# Patient Record
Sex: FEMALE | Race: WHITE | Employment: FULL TIME | URBAN - NONMETROPOLITAN AREA
[De-identification: names, ages, dates, MRNs, and addresses within clinical notes are randomized per-mention and may not be internally consistent; named-entity substitution may affect disease eponyms.]

---

## 2017-07-17 ENCOUNTER — HOSPITAL ENCOUNTER (OUTPATIENT)
Dept: NON INVASIVE DIAGNOSTICS | Age: 37
Discharge: HOME | End: 2017-07-17
Payer: COMMERCIAL

## 2017-07-17 VITALS — BODY MASS INDEX: 24.44 KG/M2 | HEIGHT: 69 IN | WEIGHT: 165 LBS

## 2017-07-17 LAB
LV EF: 55 %
LVEF MODALITY: NORMAL

## 2017-07-17 PROCEDURE — 93660 TILT TABLE EVALUATION: CPT | Performed by: INTERNAL MEDICINE

## 2017-07-17 PROCEDURE — 93306 TTE W/DOPPLER COMPLETE: CPT

## 2017-07-17 PROCEDURE — 93005 ELECTROCARDIOGRAM TRACING: CPT

## 2017-07-17 RX ORDER — ALPRAZOLAM 1 MG/1
1 TABLET ORAL DAILY PRN
COMMUNITY
End: 2019-02-07 | Stop reason: ALTCHOICE

## 2017-07-17 RX ORDER — GABAPENTIN 300 MG/1
300 CAPSULE ORAL DAILY
COMMUNITY
End: 2019-03-25

## 2017-07-18 LAB
EKG ATRIAL RATE: 64 BPM
EKG P AXIS: 14 DEGREES
EKG P-R INTERVAL: 134 MS
EKG Q-T INTERVAL: 420 MS
EKG QRS DURATION: 84 MS
EKG QTC CALCULATION (BAZETT): 433 MS
EKG R AXIS: 62 DEGREES
EKG T AXIS: 45 DEGREES
EKG VENTRICULAR RATE: 64 BPM

## 2018-02-20 ENCOUNTER — OFFICE VISIT (OUTPATIENT)
Dept: PSYCHIATRY | Age: 38
End: 2018-02-20
Payer: COMMERCIAL

## 2018-02-20 DIAGNOSIS — F41.1 GENERALIZED ANXIETY DISORDER: ICD-10-CM

## 2018-02-20 DIAGNOSIS — F41.8 OTHER SPECIFIED ANXIETY DISORDERS: ICD-10-CM

## 2018-02-20 DIAGNOSIS — F33.1 MODERATE EPISODE OF RECURRENT MAJOR DEPRESSIVE DISORDER (HCC): Primary | ICD-10-CM

## 2018-02-20 PROCEDURE — 99204 OFFICE O/P NEW MOD 45 MIN: CPT | Performed by: NURSE PRACTITIONER

## 2018-02-20 RX ORDER — ARIPIPRAZOLE 2 MG/1
2 TABLET ORAL DAILY
Qty: 30 TABLET | Refills: 0 | Status: SHIPPED | OUTPATIENT
Start: 2018-02-20 | End: 2018-03-26 | Stop reason: SDUPTHER

## 2018-02-20 RX ORDER — CITALOPRAM 20 MG/1
20 TABLET ORAL DAILY
Qty: 30 TABLET | Refills: 1 | Status: SHIPPED | OUTPATIENT
Start: 2018-02-20

## 2018-02-20 NOTE — PROGRESS NOTES
Exam    Mental Status Evaluation:   Orientation: Alert, oriented, thought content appropriate   Mood:. within normal limits      Affect:  normal      Appearance:  age appropriate and casually dressed   Activity:  Within Normal Limits   Gait/Posture: Normal   Speech:  soft   Thought Process:  within normal limits   Thought Content:  within normal limits   Cognition:  grossly intact   Memory: intact   Insight:  good   Judgment: good   Suicidal Ideations: denies suicidal ideation   Homicidal Ideations: Negative for homicidal ideation      Medication Side Effects: absent       Attention Span attention span and concentration were age appropriate                   DIAGNOSIS AND ASSESSMENT DATA     DIAGNOSIS:   1. Moderate episode of recurrent major depressive disorder ([facility] [address]    2. Generalized anxiety disorder    3. Other specified anxiety disorders        PLAN   Follow-up: in one month    Continue with Celexa and Xanax at the current doses. Abilify 2 mg daily as an argument  Counseling recommended but patient seemed not be interested  Genetic testing recommended but patient is not interested at this time    Prescriptions for this encounter:  Abilify 2 mg daily    Orders Placed This Encounter   Medications    citalopram (CELEXA) 20 MG tablet     Sig: Take 1 tablet by mouth daily     Dispense:  30 tablet     Refill:  1       Medications Discontinued During This Encounter   Medication Reason    citalopram (CELEXA) 20 MG tablet Dose adjustment    Zolpidem Tartrate (AMBIEN PO) Patient Choice       Additional orders:  None    Risks, potential side effects, possible drug-drug interactions, benefits and alternate treatments discussed in detail. All questions answered. Patient stated understanding and is agreeable to treatment plan. Patient has been instructed to seek emergency help via the emergency and/or calling 911 should symptoms become severe, worsen, or with other concerning symptoms.  Patient instructed to go

## 2018-03-26 RX ORDER — ARIPIPRAZOLE 2 MG/1
2 TABLET ORAL DAILY
Qty: 30 TABLET | Refills: 0 | Status: SHIPPED | OUTPATIENT
Start: 2018-03-26 | End: 2018-03-29 | Stop reason: DRUGHIGH

## 2018-03-29 ENCOUNTER — OFFICE VISIT (OUTPATIENT)
Dept: PSYCHIATRY | Age: 38
End: 2018-03-29
Payer: COMMERCIAL

## 2018-03-29 DIAGNOSIS — F33.1 MODERATE EPISODE OF RECURRENT MAJOR DEPRESSIVE DISORDER (HCC): Primary | ICD-10-CM

## 2018-03-29 DIAGNOSIS — F41.1 GENERALIZED ANXIETY DISORDER: ICD-10-CM

## 2018-03-29 PROCEDURE — 99213 OFFICE O/P EST LOW 20 MIN: CPT | Performed by: NURSE PRACTITIONER

## 2018-03-29 RX ORDER — ARIPIPRAZOLE 5 MG/1
5 TABLET ORAL DAILY
Qty: 90 TABLET | Refills: 2 | Status: SHIPPED | OUTPATIENT
Start: 2018-03-29 | End: 2018-12-19 | Stop reason: SDUPTHER

## 2018-03-29 NOTE — PROGRESS NOTES
[facility] PROFESSIONAL SERVS  [facility]  [address] [address] [address]  Dept: [phone]  Dept Fax: [phone]: [phone]      Visit Date: 3/29/2018      Pertinent History & Psychiatric Examination      Mary Fields is a 40 y.o.  female returns today after 5 weeks since her last visit for follow up and medication memdication. She reports with moderate improvement in her mood. Patient states that she is doing better and feels happier. She is reporting decreased anxiety and feels that her new combination of medications are helping. She is reporting that she is taking her Xanax sparingly. Patient reports that she is not getting along with her boyfriend who continues to cheat on her. He goes back and forth the patient and his childhood girlfriend. Patient reports counseling in order to deal with the pain she's feeling from her boyfriend's behavior. She is currently considering moving on with her life because she is no longer willing to tolerate this type of behavior from him. She eats well and sleeps well, taking her medications and denies any side effects. She denies any thoughts of harm to self or anybody, denies any hallucinations and no delusions noted. She is pleasant and more cheerful today.        Past Surgical History:   Procedure Laterality Date    BACK SURGERY      CHOLECYSTECTOMY       Family History   Problem Relation Age of Onset    COPD Father     Heart Disease Father     Breast Cancer Maternal Grandmother      Social History   Substance Use Topics    Smoking status: Never Smoker    Smokeless tobacco: Never Used    Alcohol use No     Current Outpatient Prescriptions   Medication Sig Dispense Refill    ARIPiprazole (ABILIFY) 2 MG tablet Take 1 tablet by mouth daily 30 tablet 0    citalopram (CELEXA) 20 MG tablet Take 1 tablet by mouth daily 30 tablet 1    gabapentin (NEURONTIN) 300 MG capsule Take 300 mg by mouth daily     

## 2018-08-09 ENCOUNTER — OFFICE VISIT (OUTPATIENT)
Dept: PSYCHIATRY | Age: 38
End: 2018-08-09
Payer: COMMERCIAL

## 2018-08-09 VITALS — WEIGHT: 199 LBS | BODY MASS INDEX: 29.39 KG/M2

## 2018-08-09 DIAGNOSIS — F41.1 GENERALIZED ANXIETY DISORDER: ICD-10-CM

## 2018-08-09 DIAGNOSIS — F32.4 MAJOR DEPRESSIVE DISORDER WITH SINGLE EPISODE, IN PARTIAL REMISSION (HCC): Primary | ICD-10-CM

## 2018-08-09 PROCEDURE — 99214 OFFICE O/P EST MOD 30 MIN: CPT | Performed by: NURSE PRACTITIONER

## 2018-08-09 NOTE — PROGRESS NOTES
No current facility-administered medications for this visit. Allergies   Allergen Reactions    Amoxicillin Hives     Mental Status Evaluation:  Level of consciousness:  Within normal limits  Appearance: Street clothes, seated on couch, with good grooming  Behavior/Motor: No abnormalities noted  Attitude toward examiner:  Cooperative, attentive, good eye contact  Speech:  spontaneous, normal rate, normal volume and well articulated  Mood:  \"very good\"  Affect:  mood congruent  Thought processes:  linear, goal directed and coherent  Thought content:  Homicidal ideation denies  Suicidal Ideation:  denies suicidal ideation  Delusions:  no evidence of delusions  Perceptual Disturbance:  denies any perceptual disturbance  Cognition:  In tact  Memory: age appropriate  Insight & Judgement: fair  Medication side effects:  absent     Clinical Assessment Medical Decision    Mild recurrent major depressive disorder (HCC)      2. Generalized anxiety disorder    3. Other specified anxiety disorders          Past Medical History:   Diagnosis Date    Depression     GERD (gastroesophageal reflux disease)      Precautions with Justification:   None    Medication Review/Mgmt: no change     Medical Issues: see above    Assessment of Risk for Harm to Self/Others:  See above    PLAN  Continue on all medications as ordered--Patient remains on Celexa, Abilify and Xanax     Risks/SE's/benefits/alternate treatments discussed, patient stated understanding and is agreeable to treatment plan. Patient's Response to Treatment: positive    I spent a total of 21 minutes with the patient and over half of that time was spent on counseling and coordination of care regarding topics discussed above.     Electronically signed by KATHY Joshua CNP on 8/9/2018 at 3:18 PM

## 2018-12-19 RX ORDER — ARIPIPRAZOLE 5 MG/1
5 TABLET ORAL DAILY
Qty: 90 TABLET | Refills: 2 | Status: SHIPPED | OUTPATIENT
Start: 2018-12-19 | End: 2019-03-25

## 2019-02-07 ENCOUNTER — OFFICE VISIT (OUTPATIENT)
Dept: PSYCHIATRY | Age: 39
End: 2019-02-07
Payer: COMMERCIAL

## 2019-02-07 VITALS
DIASTOLIC BLOOD PRESSURE: 75 MMHG | SYSTOLIC BLOOD PRESSURE: 120 MMHG | BODY MASS INDEX: 30.42 KG/M2 | HEART RATE: 71 BPM | WEIGHT: 206 LBS

## 2019-02-07 DIAGNOSIS — F41.1 GENERALIZED ANXIETY DISORDER: ICD-10-CM

## 2019-02-07 DIAGNOSIS — F32.4 MAJOR DEPRESSIVE DISORDER WITH SINGLE EPISODE, IN PARTIAL REMISSION (HCC): Primary | ICD-10-CM

## 2019-02-07 PROCEDURE — 99213 OFFICE O/P EST LOW 20 MIN: CPT | Performed by: NURSE PRACTITIONER

## 2019-03-14 ENCOUNTER — TELEPHONE (OUTPATIENT)
Dept: PSYCHIATRY | Age: 39
End: 2019-03-14

## 2019-03-21 ENCOUNTER — HOSPITAL ENCOUNTER (OUTPATIENT)
Dept: PSYCHIATRY | Age: 39
Setting detail: THERAPIES SERIES
Discharge: HOME | End: 2019-03-21
Payer: COMMERCIAL

## 2019-03-21 DIAGNOSIS — F19.10 SUBSTANCE ABUSE (HCC): ICD-10-CM

## 2019-03-21 PROCEDURE — APPSS60 APP SPLIT SHARED TIME 46-60 MINUTES: Performed by: PHYSICIAN ASSISTANT

## 2019-03-21 PROCEDURE — 90792 PSYCH DIAG EVAL W/MED SRVCS: CPT | Performed by: PSYCHIATRY & NEUROLOGY

## 2019-03-22 ENCOUNTER — HOSPITAL ENCOUNTER (OUTPATIENT)
Dept: PSYCHIATRY | Age: 39
Setting detail: THERAPIES SERIES
Discharge: HOME | End: 2019-03-22
Payer: COMMERCIAL

## 2019-03-22 PROCEDURE — 99212 OFFICE O/P EST SF 10 MIN: CPT | Performed by: PSYCHIATRY & NEUROLOGY

## 2019-03-22 PROCEDURE — H2020 THER BEHAV SVC, PER DIEM: HCPCS

## 2019-03-22 PROCEDURE — APPSS30 APP SPLIT SHARED TIME 16-30 MINUTES: Performed by: PHYSICIAN ASSISTANT

## 2019-03-25 ENCOUNTER — HOSPITAL ENCOUNTER (OUTPATIENT)
Dept: PSYCHIATRY | Age: 39
Setting detail: THERAPIES SERIES
Discharge: HOME | End: 2019-03-25
Payer: COMMERCIAL

## 2019-03-25 VITALS — SYSTOLIC BLOOD PRESSURE: 105 MMHG | DIASTOLIC BLOOD PRESSURE: 74 MMHG | RESPIRATION RATE: 16 BRPM | HEART RATE: 78 BPM

## 2019-03-25 PROCEDURE — H2020 THER BEHAV SVC, PER DIEM: HCPCS

## 2019-03-25 ASSESSMENT — SLEEP AND FATIGUE QUESTIONNAIRES
DO YOU HAVE DIFFICULTY SLEEPING: NO
DO YOU USE A SLEEP AID: NO
AVERAGE NUMBER OF SLEEP HOURS: 6

## 2019-03-27 ENCOUNTER — HOSPITAL ENCOUNTER (OUTPATIENT)
Dept: PSYCHIATRY | Age: 39
Setting detail: THERAPIES SERIES
Discharge: HOME | End: 2019-03-27
Payer: COMMERCIAL

## 2019-03-27 PROCEDURE — 90853 GROUP PSYCHOTHERAPY: CPT

## 2019-03-29 ENCOUNTER — HOSPITAL ENCOUNTER (OUTPATIENT)
Dept: PSYCHIATRY | Age: 39
Setting detail: THERAPIES SERIES
Discharge: HOME | End: 2019-03-29
Payer: COMMERCIAL

## 2019-03-29 PROCEDURE — 90853 GROUP PSYCHOTHERAPY: CPT

## 2019-04-01 ENCOUNTER — HOSPITAL ENCOUNTER (OUTPATIENT)
Dept: PSYCHIATRY | Age: 39
Setting detail: THERAPIES SERIES
Discharge: HOME | End: 2019-04-01
Payer: COMMERCIAL

## 2019-04-01 PROCEDURE — 90853 GROUP PSYCHOTHERAPY: CPT

## 2019-04-01 NOTE — GROUP NOTE
Group Therapy Note    Date: April 1    Group Start Time: 10:00 AM  Group End Time: 11:00 AM  Group Topic: Cognitive Skills    [address]      Group Therapy Note           Patient's Goal:  To explore the topic of self talk. Notes:  Patient discussed quotes related to self talk and participated in the group journal exercise. Status After Intervention:  Improved    Participation Level:  Active Listener and Interactive    Participation Quality: Appropriate, Attentive, Sharing and Supportive      Speech:  normal      Thought Process/Content: Logical      Affective Functioning: Congruent      Mood: euthymic      Level of consciousness:  Alert, Oriented x4 and Attentive      Response to Learning: Able to verbalize current knowledge/experience, Capable of insight and Progressing to goal      Endings: None Reported    Modes of Intervention: Education, Support and Socialization      Discipline Responsible: /Counselor      Signature:  Genesis Mayberry [facility] [place]

## 2019-04-03 ENCOUNTER — HOSPITAL ENCOUNTER (OUTPATIENT)
Dept: PSYCHIATRY | Age: 39
Setting detail: THERAPIES SERIES
Discharge: HOME | End: 2019-04-03
Payer: COMMERCIAL

## 2019-04-03 PROCEDURE — 90853 GROUP PSYCHOTHERAPY: CPT

## 2019-04-03 NOTE — GROUP NOTE
Group Therapy Note    Date: April 3    Group Start Time: 11:15 AM  Group End Time: 12:15 PM  Group Topic: Relapse Prevention    [address] [place]      Group Therapy Note           Patient's Goal:  To identify triggers of anxiety and practice managing anxious thoughts. Notes:  Patient discussed worksheet on recognizing symptoms and triggers of anxiety, and discussed worksheet on decatastrophizing anxious thoughts. Status After Intervention:  Improved    Participation Level:  Active Listener and Interactive    Participation Quality: Appropriate, Attentive, Sharing and Supportive      Speech:  normal      Thought Process/Content: Logical      Affective Functioning: Congruent      Mood: euthymic      Level of consciousness:  Alert, Oriented x4 and Attentive      Response to Learning: Able to verbalize current knowledge/experience, Capable of insight and Progressing to goal      Endings: None Reported    Modes of Intervention: Education, Support, Socialization and Activity      Discipline Responsible: /Counselor      Signature:  Disha Wallace [facility]

## 2019-04-03 NOTE — GROUP NOTE
Group Therapy Note    Date: April 3    Group Start Time:  8:30 AM  Group End Time:  9:45 AM  Group Topic: Psychotherapy    [address] [place]      Group Therapy Note           Patient's Goal:  To participate in the group process and get support from peers. Notes: Patient discussed positive steps in moving forward such as putting a deposit on an apartment and using her parents for increased emotional support. Status After Intervention:  Improved    Participation Level:  Active Listener and Interactive    Participation Quality: Appropriate, Attentive, Sharing and Supportive      Speech:  normal      Thought Process/Content: Logical      Affective Functioning: Congruent      Mood: euthymic      Level of consciousness:  Alert, Oriented x4 and Attentive      Response to Learning: Able to verbalize current knowledge/experience, Capable of insight and Progressing to goal      Endings: None Reported    Modes of Intervention: Support and Socialization      Discipline Responsible: /Counselor      Signature:  Gabriel Morocho [facility] [place]

## 2019-04-05 ENCOUNTER — HOSPITAL ENCOUNTER (OUTPATIENT)
Dept: PSYCHIATRY | Age: 39
Setting detail: THERAPIES SERIES
Discharge: HOME | End: 2019-04-05
Payer: COMMERCIAL

## 2019-04-05 PROCEDURE — 90853 GROUP PSYCHOTHERAPY: CPT

## 2019-04-05 NOTE — GROUP NOTE
Group Therapy Note    Date: April 5    Group Start Time: 11:15 AM  Group End Time: 12:15 PM  Group Topic: Relapse Prevention    [address], MSW, LSW      Group Therapy Note         Patient's Goal: To practice identifying positive coping skills and assess assertive communication skills related to anger. Notes: Ruperto Martin participated in the activity today. She worked through [employer] regarding \"I\" statements and \"Opposite Actions. \"    Status After Intervention:  Improved    Participation Level:  Active Listener     Participation Quality: Appropriate      Speech:  normal      Thought Process/Content: Logical      Affective Functioning: Congruent      Mood: euthymic      Level of consciousness:  Alert      Response to Learning: Able to verbalize current knowledge/experience, Capable of insight    Endings: None Reported    Modes of Intervention: Activity      Discipline Responsible: /Counselor      Signature:  Tristen Greene LPC

## 2019-04-05 NOTE — GROUP NOTE
Group Therapy Note    Date: April 5    Group Start Time: 10:00 AM  Group End Time: 11:00 AM  Group Topic: Cognitive Skills    [address], [place]      Group Therapy Note           Patient's Goal: To explore the topic of anger. Notes: Santos Hernández discussed quotes related to anger and participated in the group journal exercise. Status After Intervention:  Improved    Participation Level:  Active Listener     Participation Quality: Appropriate      Speech:  normal      Thought Process/Content: Logical      Affective Functioning: Congruent      Mood: euthymic      Level of consciousness:  Alert      Response to Learning: Able to verbalize current knowledge/experience, Capable of insight     Endings: None Reported    Modes of Intervention: Activity      Discipline Responsible: /Counselor      Signature:  Usman Ahumada LPC

## 2019-04-05 NOTE — GROUP NOTE
Group Therapy Note    Date: April 5    Group Start Time:  8:30 AM  Group End Time:  9:45 AM  Group Topic: Psychotherapy    [address]      Group Therapy Note           Patient's Goal:  To participate in the group process and get support from peers. Notes:  Patient discussed staying with her mom for the weekend due to her her boyfriend, whom she is going through a break up with, having his daughter come to stay for a few days. Status After Intervention:  Improved    Participation Level:  Active Listener and Interactive    Participation Quality: Appropriate, Attentive, Sharing and Supportive      Speech:  normal      Thought Process/Content: Logical      Affective Functioning: Congruent      Mood: euthymic      Level of consciousness:  Alert, Oriented x4 and Attentive      Response to Learning: Able to verbalize current knowledge/experience, Capable of insight and Progressing to goal      Endings: None Reported    Modes of Intervention: Support and Socialization      Discipline Responsible: /Counselor      Signature:  Godwin Lorenzana, [facility]

## 2019-04-08 ENCOUNTER — HOSPITAL ENCOUNTER (OUTPATIENT)
Dept: PSYCHIATRY | Age: 39
Setting detail: THERAPIES SERIES
Discharge: HOME | End: 2019-04-08
Payer: COMMERCIAL

## 2019-04-08 PROCEDURE — 90853 GROUP PSYCHOTHERAPY: CPT

## 2019-04-08 NOTE — GROUP NOTE
Group Therapy Note    Date: April 8    Group Start Time: 10:00 AM  Group End Time: 11:00 AM  Group Topic: Cognitive Skills    [address] Therapy Note    Attendees: 8         Patient's Goal:  To learn about other supports available. Notes:  Patient learned more about [employer] support group from a guest speaker who is a member of [employer]. Status After Intervention:  Improved    Participation Level:  Active Listener and Interactive    Participation Quality: Appropriate and Attentive      Speech:  normal      Thought Process/Content: Logical      Affective Functioning: Congruent      Mood: euthymic      Level of consciousness:  Alert, Oriented x4 and Attentive      Response to Learning: Able to verbalize/acknowledge new learning and Able to retain information      Endings: None Reported    Modes of Intervention: Education, Support and Socialization      Discipline Responsible: /Counselor      Signature:  Godwin Lorenzana, [facility] [place]

## 2019-04-08 NOTE — GROUP NOTE
Group Therapy Note    Date: April 8    Group Start Time:  8:30 AM  Group End Time:  9:45 AM  Group Topic: Psychotherapy    [address] [place]        Group Therapy Note    Attendees: 8         Patient's Goal:  To participate in the group process and get support from peers. Notes:  Patient discussed how she was able to maintain sobriety after an intense argument with her ex. Status After Intervention:  Improved    Participation Level:  Active Listener and Interactive    Participation Quality: Appropriate, Attentive, Sharing and Supportive      Speech:  normal      Thought Process/Content: Logical      Affective Functioning: Congruent      Mood: euthymic      Level of consciousness:  Alert, Oriented x4 and Attentive      Response to Learning: Able to verbalize current knowledge/experience, Capable of insight and Progressing to goal      Endings: None Reported    Modes of Intervention: Support and Socialization      Discipline Responsible: /Counselor      Signature:  Shelly Dangelo [facility]

## 2019-04-08 NOTE — GROUP NOTE
Group Therapy Note    Date: April 8    Group Start Time: 11:15 AM  Group End Time: 12:15 PM  Group Topic: Relapse Prevention    [address]  Therapy Note    Attendees: 8         Patient's Goal:  To practice enjoyment as a form of self care and get to know other group members. Notes:  Patient played three games of therapeutic [employer]t with fellow group members. Status After Intervention:  Improved    Participation Level:  Active Listener and Interactive    Participation Quality: Appropriate, Attentive, Sharing and Supportive      Speech:  normal      Thought Process/Content: Logical      Affective Functioning: Congruent      Mood: euthymic      Level of consciousness:  Alert, Oriented x4 and Attentive      Response to Learning: Able to verbalize current knowledge/experience, Capable of insight and Progressing to goal      Endings: None Reported    Modes of Intervention: Education, Support, Socialization and Activity      Discipline Responsible: /Counselor      Signature:  Sheryle Printers, [facility]

## 2019-04-12 ENCOUNTER — HOSPITAL ENCOUNTER (OUTPATIENT)
Dept: PSYCHIATRY | Age: 39
Setting detail: THERAPIES SERIES
Discharge: HOME | End: 2019-04-12
Payer: COMMERCIAL

## 2019-04-12 PROCEDURE — 90853 GROUP PSYCHOTHERAPY: CPT

## 2019-04-12 NOTE — GROUP NOTE
Group Therapy Note    Date: April 12    Group Start Time:  8:30 AM  Group End Time:  9:45 AM  Group Topic: Psychotherapy    [address] [place]        Group Therapy Note    Attendees: 8         Patient's Goal:  To participate in the group process and get support from peers. Notes:  Patient introduced herself to the new group member and listened during other members' check ins. Patient was the last group member to check in and left before her turn to share. Patient left a note at stating that she was not feeling well and would be leaving groups for the day. Status After Intervention:  Unchanged    Participation Level:  Active Listener and Minimal    Participation Quality: Appropriate and Attentive      Speech:  normal      Thought Process/Content: Logical      Affective Functioning: Congruent      Mood: euthymic      Level of consciousness:  Alert, Oriented x4 and Attentive      Response to Learning: Able to verbalize current knowledge/experience      Endings: None Reported    Modes of Intervention: Support and Socialization      Discipline Responsible: /Counselor      Signature:  Isamar Lu, [facility] [place]

## 2019-04-19 ENCOUNTER — HOSPITAL ENCOUNTER (OUTPATIENT)
Dept: PSYCHIATRY | Age: 39
Setting detail: THERAPIES SERIES
End: 2019-04-19
Payer: COMMERCIAL

## 2019-04-22 ENCOUNTER — HOSPITAL ENCOUNTER (OUTPATIENT)
Dept: PSYCHIATRY | Age: 39
Setting detail: THERAPIES SERIES
End: 2019-04-22
Payer: COMMERCIAL

## 2019-04-24 ENCOUNTER — APPOINTMENT (OUTPATIENT)
Dept: PSYCHIATRY | Age: 39
End: 2019-04-24
Payer: COMMERCIAL

## 2019-04-26 ENCOUNTER — APPOINTMENT (OUTPATIENT)
Dept: PSYCHIATRY | Age: 39
End: 2019-04-26
Payer: COMMERCIAL

## 2019-04-29 ENCOUNTER — APPOINTMENT (OUTPATIENT)
Dept: PSYCHIATRY | Age: 39
End: 2019-04-29
Payer: COMMERCIAL

## 2021-03-31 ENCOUNTER — APPOINTMENT (RX ONLY)
Dept: URBAN - METROPOLITAN AREA CLINIC 154 | Facility: CLINIC | Age: 41
Setting detail: DERMATOLOGY
End: 2021-03-31

## 2021-03-31 DIAGNOSIS — D485 NEOPLASM OF UNCERTAIN BEHAVIOR OF SKIN: ICD-10-CM | Status: WORSENING

## 2021-03-31 DIAGNOSIS — D22 MELANOCYTIC NEVI: ICD-10-CM | Status: STABLE

## 2021-03-31 DIAGNOSIS — L82.1 OTHER SEBORRHEIC KERATOSIS: ICD-10-CM | Status: STABLE

## 2021-03-31 DIAGNOSIS — L81.4 OTHER MELANIN HYPERPIGMENTATION: ICD-10-CM | Status: STABLE

## 2021-03-31 DIAGNOSIS — D18.0 HEMANGIOMA: ICD-10-CM | Status: STABLE

## 2021-03-31 DIAGNOSIS — L57.8 OTHER SKIN CHANGES DUE TO CHRONIC EXPOSURE TO NONIONIZING RADIATION: ICD-10-CM | Status: STABLE

## 2021-03-31 PROBLEM — D48.5 NEOPLASM OF UNCERTAIN BEHAVIOR OF SKIN: Status: ACTIVE | Noted: 2021-03-31

## 2021-03-31 PROBLEM — D22.72 MELANOCYTIC NEVI OF LEFT LOWER LIMB, INCLUDING HIP: Status: ACTIVE | Noted: 2021-03-31

## 2021-03-31 PROBLEM — D18.01 HEMANGIOMA OF SKIN AND SUBCUTANEOUS TISSUE: Status: ACTIVE | Noted: 2021-03-31

## 2021-03-31 PROBLEM — D22.5 MELANOCYTIC NEVI OF TRUNK: Status: ACTIVE | Noted: 2021-03-31

## 2021-03-31 PROBLEM — D22.62 MELANOCYTIC NEVI OF LEFT UPPER LIMB, INCLUDING SHOULDER: Status: ACTIVE | Noted: 2021-03-31

## 2021-03-31 PROBLEM — D22.61 MELANOCYTIC NEVI OF RIGHT UPPER LIMB, INCLUDING SHOULDER: Status: ACTIVE | Noted: 2021-03-31

## 2021-03-31 PROBLEM — D22.71 MELANOCYTIC NEVI OF RIGHT LOWER LIMB, INCLUDING HIP: Status: ACTIVE | Noted: 2021-03-31

## 2021-03-31 PROCEDURE — ? SUNSCREEN RECOMMENDATIONS

## 2021-03-31 PROCEDURE — 11103 TANGNTL BX SKIN EA SEP/ADDL: CPT

## 2021-03-31 PROCEDURE — ? COUNSELING

## 2021-03-31 PROCEDURE — 11102 TANGNTL BX SKIN SINGLE LES: CPT

## 2021-03-31 PROCEDURE — ? FULL BODY SKIN EXAM

## 2021-03-31 PROCEDURE — ? ADDITIONAL NOTES

## 2021-03-31 PROCEDURE — 99203 OFFICE O/P NEW LOW 30 MIN: CPT | Mod: 25

## 2021-03-31 PROCEDURE — ? BIOPSY BY SHAVE METHOD

## 2021-03-31 ASSESSMENT — LOCATION ZONE DERM
LOCATION ZONE: TRUNK
LOCATION ZONE: LEG
LOCATION ZONE: ARM
LOCATION ZONE: FACE

## 2021-03-31 ASSESSMENT — LOCATION DETAILED DESCRIPTION DERM
LOCATION DETAILED: RIGHT VENTRAL PROXIMAL FOREARM
LOCATION DETAILED: LEFT ANTERIOR DISTAL THIGH
LOCATION DETAILED: LEFT DISTAL POSTERIOR THIGH
LOCATION DETAILED: RIGHT INFERIOR UPPER BACK
LOCATION DETAILED: RIGHT PROXIMAL POSTERIOR THIGH
LOCATION DETAILED: RIGHT PROXIMAL DORSAL FOREARM
LOCATION DETAILED: LEFT ANTERIOR DISTAL UPPER ARM
LOCATION DETAILED: LEFT VENTRAL PROXIMAL FOREARM
LOCATION DETAILED: RIGHT SUPERIOR LATERAL LOWER BACK
LOCATION DETAILED: EPIGASTRIC SKIN
LOCATION DETAILED: RIGHT ANTERIOR DISTAL UPPER ARM
LOCATION DETAILED: LEFT PROXIMAL POSTERIOR UPPER ARM
LOCATION DETAILED: RIGHT DISTAL POSTERIOR THIGH
LOCATION DETAILED: LEFT MEDIAL MALAR CHEEK
LOCATION DETAILED: LEFT INFERIOR CENTRAL MALAR CHEEK
LOCATION DETAILED: SUPERIOR THORACIC SPINE

## 2021-03-31 ASSESSMENT — LOCATION SIMPLE DESCRIPTION DERM
LOCATION SIMPLE: ABDOMEN
LOCATION SIMPLE: RIGHT FOREARM
LOCATION SIMPLE: RIGHT UPPER ARM
LOCATION SIMPLE: LEFT CHEEK
LOCATION SIMPLE: UPPER BACK
LOCATION SIMPLE: LEFT FOREARM
LOCATION SIMPLE: RIGHT LOWER BACK
LOCATION SIMPLE: RIGHT UPPER BACK
LOCATION SIMPLE: LEFT UPPER ARM
LOCATION SIMPLE: RIGHT POSTERIOR THIGH
LOCATION SIMPLE: LEFT THIGH
LOCATION SIMPLE: LEFT POSTERIOR THIGH
LOCATION SIMPLE: LEFT POSTERIOR UPPER ARM

## 2021-05-04 ENCOUNTER — APPOINTMENT (RX ONLY)
Dept: URBAN - METROPOLITAN AREA CLINIC 154 | Facility: CLINIC | Age: 41
Setting detail: DERMATOLOGY
End: 2021-05-04

## 2021-05-04 DIAGNOSIS — D22 MELANOCYTIC NEVI: ICD-10-CM

## 2021-05-04 PROBLEM — D03.59 MELANOMA IN SITU OF OTHER PART OF TRUNK: Status: ACTIVE | Noted: 2021-05-04

## 2021-05-04 PROBLEM — D48.5 NEOPLASM OF UNCERTAIN BEHAVIOR OF SKIN: Status: ACTIVE | Noted: 2021-05-04

## 2021-05-04 PROCEDURE — 11602 EXC TR-EXT MAL+MARG 1.1-2 CM: CPT

## 2021-05-04 PROCEDURE — 11402 EXC TR-EXT B9+MARG 1.1-2 CM: CPT

## 2021-05-04 PROCEDURE — ? EXCISION

## 2021-05-04 PROCEDURE — 13101 CMPLX RPR TRUNK 2.6-7.5 CM: CPT

## 2021-05-04 ASSESSMENT — LOCATION SIMPLE DESCRIPTION DERM: LOCATION SIMPLE: RIGHT UPPER BACK

## 2021-05-04 ASSESSMENT — LOCATION ZONE DERM: LOCATION ZONE: TRUNK

## 2021-05-04 ASSESSMENT — LOCATION DETAILED DESCRIPTION DERM: LOCATION DETAILED: RIGHT INFERIOR UPPER BACK

## 2021-05-27 ENCOUNTER — APPOINTMENT (RX ONLY)
Dept: URBAN - METROPOLITAN AREA CLINIC 154 | Facility: CLINIC | Age: 41
Setting detail: DERMATOLOGY
End: 2021-05-27

## 2021-05-27 PROBLEM — D03.62 MELANOMA IN SITU OF LEFT UPPER LIMB, INCLUDING SHOULDER: Status: ACTIVE | Noted: 2021-05-27

## 2021-05-27 PROCEDURE — ? EXCISION

## 2021-05-27 PROCEDURE — 11602 EXC TR-EXT MAL+MARG 1.1-2 CM: CPT

## 2021-05-27 PROCEDURE — 13121 CMPLX RPR S/A/L 2.6-7.5 CM: CPT

## 2021-06-17 ENCOUNTER — APPOINTMENT (RX ONLY)
Dept: URBAN - METROPOLITAN AREA CLINIC 154 | Facility: CLINIC | Age: 41
Setting detail: DERMATOLOGY
End: 2021-06-17

## 2021-06-17 DIAGNOSIS — L92.0 GRANULOMA ANNULARE: ICD-10-CM | Status: STABLE

## 2021-06-17 DIAGNOSIS — L82.1 OTHER SEBORRHEIC KERATOSIS: ICD-10-CM | Status: STABLE

## 2021-06-17 DIAGNOSIS — L91.0 HYPERTROPHIC SCAR: ICD-10-CM | Status: STABLE

## 2021-06-17 PROCEDURE — ? COUNSELING

## 2021-06-17 PROCEDURE — ? FULL BODY SKIN EXAM - DECLINED

## 2021-06-17 PROCEDURE — ? ADDITIONAL NOTES

## 2021-06-17 PROCEDURE — ? TREATMENT REGIMEN

## 2021-06-17 PROCEDURE — 99213 OFFICE O/P EST LOW 20 MIN: CPT

## 2021-06-17 ASSESSMENT — LOCATION SIMPLE DESCRIPTION DERM
LOCATION SIMPLE: LEFT ANKLE
LOCATION SIMPLE: LEFT UPPER ARM
LOCATION SIMPLE: RIGHT PRETIBIAL REGION
LOCATION SIMPLE: RIGHT POSTERIOR THIGH

## 2021-06-17 ASSESSMENT — LOCATION DETAILED DESCRIPTION DERM
LOCATION DETAILED: LEFT LATERAL PROXIMAL UPPER ARM
LOCATION DETAILED: RIGHT MEDIAL DISTAL PRETIBIAL REGION
LOCATION DETAILED: LEFT POSTERIOR LATERAL MALLEOLUS
LOCATION DETAILED: RIGHT PROXIMAL LATERAL POSTERIOR THIGH

## 2021-06-17 ASSESSMENT — LOCATION ZONE DERM
LOCATION ZONE: LEG
LOCATION ZONE: ARM

## 2021-06-17 NOTE — HPI: SKIN LESIONS
How Severe Is Your Skin Lesion?: mild
Have Your Skin Lesions Been Treated?: not been treated
Is This A New Presentation, Or A Follow-Up?: Skin Lesions
Additional History: Lesion on right thigh came off and has grown back.

## 2021-06-17 NOTE — PROCEDURE: TREATMENT REGIMEN
Plan: Patient reports no itch. RTC if worsens.
Detail Level: Simple
Otc Regimen: Hydrocortisone cream as needed for itch.
Plan: No treatment necessary. Discussed that if she would like treatment it would be cosmetic. Lesion asymptomatic at this time
